# Patient Record
Sex: MALE | Race: BLACK OR AFRICAN AMERICAN
[De-identification: names, ages, dates, MRNs, and addresses within clinical notes are randomized per-mention and may not be internally consistent; named-entity substitution may affect disease eponyms.]

---

## 2020-02-13 ENCOUNTER — HOSPITAL ENCOUNTER (INPATIENT)
Dept: HOSPITAL 72 - EMR | Age: 85
LOS: 4 days | Discharge: NURSING, REHAB OR LONG-TERM CARE | DRG: 191 | End: 2020-02-17
Payer: MEDICARE

## 2020-02-13 VITALS — WEIGHT: 160 LBS | HEIGHT: 67 IN | BODY MASS INDEX: 25.11 KG/M2

## 2020-02-13 VITALS — SYSTOLIC BLOOD PRESSURE: 140 MMHG | DIASTOLIC BLOOD PRESSURE: 77 MMHG

## 2020-02-13 VITALS — DIASTOLIC BLOOD PRESSURE: 81 MMHG | SYSTOLIC BLOOD PRESSURE: 122 MMHG

## 2020-02-13 VITALS — DIASTOLIC BLOOD PRESSURE: 59 MMHG | SYSTOLIC BLOOD PRESSURE: 115 MMHG

## 2020-02-13 VITALS — DIASTOLIC BLOOD PRESSURE: 76 MMHG | SYSTOLIC BLOOD PRESSURE: 130 MMHG

## 2020-02-13 DIAGNOSIS — K21.9: ICD-10-CM

## 2020-02-13 DIAGNOSIS — J40: ICD-10-CM

## 2020-02-13 DIAGNOSIS — Z87.891: ICD-10-CM

## 2020-02-13 DIAGNOSIS — D63.8: ICD-10-CM

## 2020-02-13 DIAGNOSIS — I10: ICD-10-CM

## 2020-02-13 DIAGNOSIS — R04.2: ICD-10-CM

## 2020-02-13 DIAGNOSIS — J44.1: Primary | ICD-10-CM

## 2020-02-13 DIAGNOSIS — K44.9: ICD-10-CM

## 2020-02-13 LAB
ADD MANUAL DIFF: NO
ALBUMIN SERPL-MCNC: 2.8 G/DL (ref 3.4–5)
ALBUMIN/GLOB SERPL: 0.6 {RATIO} (ref 1–2.7)
ALP SERPL-CCNC: 83 U/L (ref 46–116)
ALT SERPL-CCNC: 31 U/L (ref 12–78)
ANION GAP SERPL CALC-SCNC: 8 MMOL/L (ref 5–15)
AST SERPL-CCNC: 28 U/L (ref 15–37)
BASOPHILS NFR BLD AUTO: 0.7 % (ref 0–2)
BILIRUB SERPL-MCNC: 0.3 MG/DL (ref 0.2–1)
BUN SERPL-MCNC: 26 MG/DL (ref 7–18)
CALCIUM SERPL-MCNC: 9.1 MG/DL (ref 8.5–10.1)
CHLORIDE SERPL-SCNC: 102 MMOL/L (ref 98–107)
CO2 SERPL-SCNC: 31 MMOL/L (ref 21–32)
CREAT SERPL-MCNC: 1.1 MG/DL (ref 0.55–1.3)
EOSINOPHIL NFR BLD AUTO: 1.2 % (ref 0–3)
ERYTHROCYTE [DISTWIDTH] IN BLOOD BY AUTOMATED COUNT: 13 % (ref 11.6–14.8)
GLOBULIN SER-MCNC: 4.9 G/DL
HCT VFR BLD CALC: 34.5 % (ref 42–52)
HGB BLD-MCNC: 11.2 G/DL (ref 14.2–18)
LYMPHOCYTES NFR BLD AUTO: 9.4 % (ref 20–45)
MCV RBC AUTO: 84 FL (ref 80–99)
MONOCYTES NFR BLD AUTO: 8.9 % (ref 1–10)
NEUTROPHILS NFR BLD AUTO: 79.8 % (ref 45–75)
PHOSPHATE SERPL-MCNC: 3.3 MG/DL (ref 2.5–4.9)
PLATELET # BLD: 320 K/UL (ref 150–450)
POTASSIUM SERPL-SCNC: 4.2 MMOL/L (ref 3.5–5.1)
RBC # BLD AUTO: 4.12 M/UL (ref 4.7–6.1)
SODIUM SERPL-SCNC: 140 MMOL/L (ref 136–145)
WBC # BLD AUTO: 10.7 K/UL (ref 4.8–10.8)

## 2020-02-13 PROCEDURE — 83880 ASSAY OF NATRIURETIC PEPTIDE: CPT

## 2020-02-13 PROCEDURE — 84100 ASSAY OF PHOSPHORUS: CPT

## 2020-02-13 PROCEDURE — 94640 AIRWAY INHALATION TREATMENT: CPT

## 2020-02-13 PROCEDURE — 87040 BLOOD CULTURE FOR BACTERIA: CPT

## 2020-02-13 PROCEDURE — 83735 ASSAY OF MAGNESIUM: CPT

## 2020-02-13 PROCEDURE — 96368 THER/DIAG CONCURRENT INF: CPT

## 2020-02-13 PROCEDURE — 71045 X-RAY EXAM CHEST 1 VIEW: CPT

## 2020-02-13 PROCEDURE — 80053 COMPREHEN METABOLIC PANEL: CPT

## 2020-02-13 PROCEDURE — 83605 ASSAY OF LACTIC ACID: CPT

## 2020-02-13 PROCEDURE — 99285 EMERGENCY DEPT VISIT HI MDM: CPT

## 2020-02-13 PROCEDURE — 85025 COMPLETE CBC W/AUTO DIFF WBC: CPT

## 2020-02-13 PROCEDURE — 71250 CT THORAX DX C-: CPT

## 2020-02-13 PROCEDURE — 93005 ELECTROCARDIOGRAM TRACING: CPT

## 2020-02-13 PROCEDURE — 36415 COLL VENOUS BLD VENIPUNCTURE: CPT

## 2020-02-13 PROCEDURE — 84484 ASSAY OF TROPONIN QUANT: CPT

## 2020-02-13 PROCEDURE — 83690 ASSAY OF LIPASE: CPT

## 2020-02-13 PROCEDURE — 87081 CULTURE SCREEN ONLY: CPT

## 2020-02-13 PROCEDURE — 96365 THER/PROPH/DIAG IV INF INIT: CPT

## 2020-02-13 RX ADMIN — Medication SCH MCG: at 15:00

## 2020-02-13 RX ADMIN — ALBUTEROL SULFATE SCH MG: 2.5 SOLUTION RESPIRATORY (INHALATION) at 15:37

## 2020-02-13 RX ADMIN — ALBUTEROL SULFATE SCH MG: 2.5 SOLUTION RESPIRATORY (INHALATION) at 15:21

## 2020-02-13 RX ADMIN — Medication SCH MCG: at 15:21

## 2020-02-13 RX ADMIN — Medication SCH MCG: at 15:37

## 2020-02-13 RX ADMIN — ALBUTEROL SULFATE SCH MG: 2.5 SOLUTION RESPIRATORY (INHALATION) at 15:00

## 2020-02-13 NOTE — NUR
NURSE NOTES:

Dr. Reyez called with the following orders:



- Resume snf meds

- resume snf diet

- resume snf code status

- duoneb q4 prn

- dvt: scd

- consult Dr. Minh Carvalho 



Will input orders and will continue to monitor.

## 2020-02-13 NOTE — DIAGNOSTIC IMAGING REPORT
. Indication: Cough

 

Technique: One view of the chest

 

Comparison: none

 

Findings: There is mild central bronchial wall thickening. Lungs and pleural spaces

are otherwise clear. The heart size is upper limits of normal. The aorta is tortuous

ectatic and calcified

 

Impression: Central bronchial wall thickening, nonspecific. No acute process

otherwise

## 2020-02-13 NOTE — NUR
NURSE NOTES:

Called and left a message with Dr. Reyez regarding admission orders. Awaiting call back.

## 2020-02-13 NOTE — NUR
ED Nurse Note:

PT was transported to TELE room 205 accompanied by 2 RNs via gurney with 
monitor box in stable condition.  Belonging list given to JUAN DAVID Seaman.

## 2020-02-13 NOTE — NUR
ED Nurse Note:



PT Brought in by ambulance from [facility] for  
abnormal CXR modest right upper lobe PNA.

## 2020-02-13 NOTE — HISTORY AND PHYSICAL REPORT
DATE OF ADMISSION:  02/13/2020

HISTORY OF PRESENT ILLNESS:  The patient is admitted for pneumonia and COPD

exacerbation.  The patient complains of shortness of breath and cough for

couple of days.  The patient also had a chest x-ray that shows pneumonia

at the nursing home.  He is admitted for that as well.  Denies nausea,

vomiting, or diarrhea.  No fever or chills.  Does have shortness of

breath.



PAST MEDICAL HISTORY:  History of COPD, history of hypertension, and

GERD.



PAST SURGICAL HISTORY:  None.



ALLERGIES:  No known allergies.



MEDICATIONS:  ______, amlodipine, omeprazole, breathing treatment.



FAMILY HISTORY:  Noncontributory.



SOCIAL HISTORY:  He has history of smoking.  Denies alcohol or illicit

drugs.  Comes from a nursing home.



REVIEW OF SYSTEMS:  HEENT:  Denies headaches.  RESPIRATORY:  Reports

shortness of breath and cough and wheezing for couple of days.

CARDIOVASCULAR:  Denies chest pain.  GASTROINTESTINAL:  Denies nausea,

vomiting, or diarrhea.  EXTREMITIES:  Denies pain.  CENTRAL NERVOUS

SYSTEM:  Denies changes in speech pattern.



PHYSICAL EXAMINATION:

VITAL SIGNS:  Temperature 97.5, pulse is 95, blood pressure 100/97.

HEENT:  PERRLA.

NECK:  Supple.  No lymphadenopathy.

CHEST:  Clear to auscultation.

CARDIOVASCULAR:  Regular rate and rhythm.  No murmurs or extra sounds.

GASTROINTESTINAL:  Soft, nontender, nondistended.  No organomegaly.

EXTREMITIES:  No edema.  Moves all four extremities.  Sensory intact to

light touch.  Reflexes on both sides.



LABORATORY DATA:  WBC of 10.7.  Sodium 140, potassium 4.2, BUN of 22,

creatinine 1.1, glucose 148.  Chest x-ray shows pneumonia.



ASSESSMENT AND PLAN:  COPD exacerbation as well as pneumonia.  I have asked

Dr. Rodolfo Limon as well as Dr. Minh Carvalho to see the patient for COPD

as well as pneumonia.  Antibiotics per Dr. Minh Carvalho.









  ______________________________________________

  Car Reyez M.D.





DR:  BOB

D:  02/13/2020 21:23

T:  02/13/2020 23:12

JOB#:  [id number]

CC:

## 2020-02-13 NOTE — NUR
NURSE NOTES:

Received pt from JUAN DAVID Seaman. Pt awake, alert, and talkative. Bed in lowest position. Call light 
within reach. Will continue to monitor.

## 2020-02-13 NOTE — EMERGENCY ROOM REPORT
History of Present Illness


General


Chief Complaint:  Cough, shortness of breath


Source:  Patient, EMS





Present Illness


HPI


84-year-old male history of hypertension history of COPD presents with cough, 

shortness of breath recently diagnosed with pneumonia day ago, no aggravating 

relieving factors severity is moderate, constant he denies any fevers, he 

denies any chest pain patient was sent in for evaluation and treatment


Allergies:  


Coded Allergies:  


     No Known Allergies (Unverified , 2/13/20)





Patient History


Past Medical History:  see triage record


Social History:  Reports: smoking


Reviewed Nursing Documentation:  PMH: Agreed; PSxH: Agreed





Review of Systems


All Other Systems:  negative except mentioned in HPI





Physical Exam





Vital Signs








  Date Time  Temp Pulse Resp B/P (MAP) Pulse Ox O2 Delivery O2 Flow Rate FiO2


 


2/13/20 14:23 97.0 82 18 132/70 (90) 96 Nasal Cannula 3.0 








Sp02 EP Interpretation:  reviewed, normal


General Appearance:  no apparent distress, alert


Head:  normocephalic, atraumatic


Eyes:  bilateral eye PERRL, bilateral eye EOMI


ENT:  uvula midline, moist mucus membranes


Neck:  supple, thyroid normal, supple/symm/no masses


Respiratory:  decreased breath sounds, accessory muscle use, rales, wheezing, 

other - Moderate wheezing


Cardiovascular #1:  normal peripheral pulses, regular rate, rhythm, no edema, 

no gallop, no murmur


Gastrointestinal:  non tender, soft, no guarding, no rebound


Musculoskeletal:  normal inspection


Neurologic:  alert, oriented x3


Psychiatric:  mood/affect normal


Skin:  no rash, warm/dry





Medical Decision Making


Diagnostic Impression:  


 Primary Impression:  


 Pneumonia


 Qualified Codes:  J18.9 - Pneumonia, unspecified organism


 Additional Impression:  


 COPD with exacerbation


ER Course


84-year-old male, presents with shortness of breath, cough with sputum 

production, patient recently diagnosed with pneumonia a day or so ago, will 

admit patient for antibiotics, steroids, duo nebs, will treat patient for H CAP


Reevaluation 3:48 PM patient remained stable doing better with breathing 

treatment steroids, antibiotics


Plan to admit to Dr. Reyez





Laboratory Tests








Test


  2/13/20


14:36


 


White Blood Count


  10.7 K/UL


(4.8-10.8)


 


Red Blood Count


  4.12 M/UL


(4.70-6.10)  L


 


Hemoglobin


  11.2 G/DL


(14.2-18.0)  L


 


Hematocrit


  34.5 %


(42.0-52.0)  L


 


Mean Corpuscular Volume 84 FL (80-99)  


 


Mean Corpuscular Hemoglobin


  27.3 PG


(27.0-31.0)


 


Mean Corpuscular Hemoglobin


Concent 32.6 G/DL


(32.0-36.0)


 


Red Cell Distribution Width


  13.0 %


(11.6-14.8)


 


Platelet Count


  320 K/UL


(150-450)


 


Mean Platelet Volume


  7.3 FL


(6.5-10.1)


 


Neutrophils (%) (Auto)


  79.8 %


(45.0-75.0)  H


 


Lymphocytes (%) (Auto)


  9.4 %


(20.0-45.0)  L


 


Monocytes (%) (Auto)


  8.9 %


(1.0-10.0)


 


Eosinophils (%) (Auto)


  1.2 %


(0.0-3.0)


 


Basophils (%) (Auto)


  0.7 %


(0.0-2.0)


 


Sodium Level


  140 MMOL/L


(136-145)


 


Potassium Level


  4.2 MMOL/L


(3.5-5.1)


 


Chloride Level


  102 MMOL/L


()


 


Carbon Dioxide Level


  31 MMOL/L


(21-32)


 


Anion Gap


  8 mmol/L


(5-15)


 


Blood Urea Nitrogen


  26 mg/dL


(7-18)  H


 


Creatinine


  1.1 MG/DL


(0.55-1.30)


 


Estimate Glomerular


Filtration Rate > 60 mL/min


(>60)


 


Glucose Level


  148 MG/DL


()  H


 


Lactic Acid Level


  1.50 mmol/L


(0.4-2.0)


 


Calcium Level


  9.1 MG/DL


(8.5-10.1)


 


Phosphorus Level


  3.3 MG/DL


(2.5-4.9)


 


Magnesium Level


  1.9 MG/DL


(1.8-2.4)


 


Total Bilirubin


  0.3 MG/DL


(0.2-1.0)


 


Aspartate Amino Transferase


(AST) 28 U/L (15-37)


 


 


Alanine Aminotransferase (ALT)


  31 U/L (12-78)


 


 


Alkaline Phosphatase


  83 U/L


()


 


Troponin I


  0.000 ng/mL


(0.000-0.056)


 


Pro-B-Type Natriuretic Peptide


  387 pg/mL


(0-125)  H


 


Total Protein


  7.7 G/DL


(6.4-8.2)


 


Albumin


  2.8 G/DL


(3.4-5.0)  L


 


Globulin 4.9 g/dL  


 


Albumin/Globulin Ratio


  0.6 (1.0-2.7)


L


 


Lipase


  232 U/L


()








EKG Diagnostic Results


EKG Time:  14:38


EP Interpretation:  NSR rate 92, normal axis, no acute st elevations, qtc 435





Rhythm Strip Diag. Results


Rhythm Strip Time:  15:01


EP Interpretation:  yes


Rate:  74


Rhythm:  NSR, no PVC's, no ectopy





Chest X-Ray Diagnostic Results


Chest X-Ray Diagnostic Results :  


   Chest X-Ray Ordered:  Yes


   # of Views/Limited/Complete:  1 View


   Indication:  Shortness of Breath


   EP Interpretation:  Yes


   Interpretation:  other - Possible right lower lobe infiltrate


   Impression:  Other - Pneumonia


   Electronically Signed by:  Florencio Santoyo MD


Disposition:  ADMITTED AS INPATIENT


Condition:  Serious











Florencio Santoyo MD Feb 13, 2020 14:19

## 2020-02-13 NOTE — NUR
NURSE NOTES:

Received report from JUAN DAVID Velazquez. Patient in bed resting, no active s/s cardiac, respiratory 
distress noticed at this time. Patient AOx4, on 4L oxygen via NC, tolerating well 95%. VS at 
the time of arrival, /78, , 97.9, O2 sat. 95%. Patient able to ambulate to 
[facility] to bed. IV on right Ac 20G, asymptomatic, patent, intact. Bed in lowest position, 
side rails upx3, call light within reach. Will continue to monitor.

## 2020-02-14 VITALS — SYSTOLIC BLOOD PRESSURE: 141 MMHG | DIASTOLIC BLOOD PRESSURE: 76 MMHG

## 2020-02-14 VITALS — DIASTOLIC BLOOD PRESSURE: 62 MMHG | SYSTOLIC BLOOD PRESSURE: 118 MMHG

## 2020-02-14 VITALS — DIASTOLIC BLOOD PRESSURE: 70 MMHG | SYSTOLIC BLOOD PRESSURE: 149 MMHG

## 2020-02-14 VITALS — SYSTOLIC BLOOD PRESSURE: 142 MMHG | DIASTOLIC BLOOD PRESSURE: 69 MMHG

## 2020-02-14 VITALS — SYSTOLIC BLOOD PRESSURE: 145 MMHG | DIASTOLIC BLOOD PRESSURE: 73 MMHG

## 2020-02-14 VITALS — DIASTOLIC BLOOD PRESSURE: 77 MMHG | SYSTOLIC BLOOD PRESSURE: 134 MMHG

## 2020-02-14 RX ADMIN — GUAIFENESIN AND DEXTROMETHORPHAN PRN ML: 100; 10 SYRUP ORAL at 21:06

## 2020-02-14 RX ADMIN — GUAIFENESIN AND DEXTROMETHORPHAN PRN ML: 100; 10 SYRUP ORAL at 13:41

## 2020-02-14 RX ADMIN — SODIUM CHLORIDE SCH MLS/HR: 0.9 INJECTION INTRAVENOUS at 11:08

## 2020-02-14 RX ADMIN — GUAIFENESIN AND DEXTROMETHORPHAN PRN ML: 100; 10 SYRUP ORAL at 09:40

## 2020-02-14 NOTE — NUR
P.T Note:

P.T evaluation completed. Pt is alert, O x 4 , pleasant and cooperative. No c/o pain. Pt is  
functioning independently  and at currently at baseline. Pt's present functional status does 
not warrant skilled P.T services a this time. Pt educated on importance of OOB activities VS 
bedrest during stay unless otherwise ordered. Pt verbalized understanding. No further P.T 
follow needed. DC P.T services. Thank you for this referral.

## 2020-02-14 NOTE — NUR
NURSE NOTES: PATIENT AWAKE, ALERT/ORIENTED X3, VERBALLY RESPONSIVE, DENIES PAIN. HEARING 
DEFICIT BILATERALLY. NO SIGNS AND SYMPTOMS OF ACUTE CARDIO RESPIRATORY DISTRESS/SHORTNESS OF 
BREATH, DENIES CHEST PAIN, NO PERIPHERAL EDEMA. IV INTACT TO RIGHT AC/GAUGE 20 SL, NO 
REDNESS/SWELLING NOTED. SINUS RHYTHM ON CARDIAC MONITOR. NO COMPLAINTS OF GI DISCOMFORT, NO 
N/V/D. SIDE RAILS UP X3/BED IN LOWEST POSITION FOR SAFETY, ENCOURAGED PATIENT TO UTILIZE 
CALL LIGHT FOR ASSISTANCE. CONTINUE WITH CURRENT PLAN OF CARE. NAD.

## 2020-02-14 NOTE — NUR
CASE MANAGEMENT:REVIEW



84 YR OLD MALE BIBA FROM [facility]



CC : ABNORMAL CHEST XRAY



SI: PNA. COPD

97.0   82  18    132/70   96% ON 3L/NC



IS: IV VANCOMYCIN

IV CEFEPIME

DUONEB HHN Q15

PREDNISONE PO

CHEST XRAY

BLOOD CX

**: TO TELEMETRY 

DCP: RETURN TO SNF



**INTERQUAL CRITERIA MET

## 2020-02-14 NOTE — CONSULTATION
History of Present Illness


General


Chief Complaint:  Upper Respiratory Illness





Present Illness


Allergies:  


Coded Allergies:  


     No Known Allergies (Unverified , 2/13/20)





Medication History


Scheduled


Amlodipine Besylate* (Amlodipine Besylate*), 5 MG ORAL DAILY, (Reported)


Clonidine Hcl* (Catapres*), 0.1 MG ORAL EVERY 6 HOURS, (Reported)


Ipratropium/Albuterol Sulfate (DuoNeb 0.5-3(2.5)mg/3ml), 3 ML HHN EVERY 4 HOURS,

 (Reported)


Omeprazole Magnesium (Prilosec Otc), 20 MG ORAL DAILY, (Reported)





Scheduled PRN


Acetaminophen* (Acetaminophen 325MG Tablet*), 650 MG ORAL Q4H PRN for For Pain, 

(Reported)


Ondansetron* (Zofran*), 4 MG ORAL Q6H PRN for Nausea & Vomiting, (Reported)





Patient History


Healthcare decision maker


in chart


Resuscitation status


Full Code


Advanced Directive on File








Physical Exam





Last 24 Hour Vital Signs








  Date Time  Temp Pulse Resp B/P (MAP) Pulse Ox O2 Delivery O2 Flow Rate FiO2


 


2/14/20 12:01 97.7 89 18 145/73 (97) 97   


 


2/14/20 12:00  94      


 


2/14/20 09:40  81  134/77    


 


2/14/20 09:00      Nasal Cannula 3.0 


 


2/14/20 08:00 96.8 81 18 134/77 (96) 96   


 


2/14/20 07:49  92      


 


2/14/20 07:02     96 Nasal Cannula 2.0 28


 


2/14/20 04:00  82      


 


2/14/20 04:00 97.7 92 18 141/76 (97) 96   


 


2/14/20 00:00 97.4 95 21 118/62 (80) 99   


 


2/14/20 00:00  95      


 


2/13/20 22:13  99 20  98 Nasal Cannula 3.0 32





  96 20  94   


 


2/13/20 21:55      Nasal Cannula 2.0 


 


2/13/20 20:00 97.8 116 18 115/59 (77) 99   


 


2/13/20 20:00  99      


 


2/13/20 18:31 97.8 95 17 135/76 96 Nasal Cannula 3.0 


 


2/13/20 18:19 97.5 90 17 130/76 96 Nasal Cannula 3.0 


 


2/13/20 16:22 97.5 94 17 122/81 97 Nasal Cannula 3.0 

















Intake and Output  


 


 2/13/20 2/14/20





 19:00 07:00


 


Intake Total 385.0 ml 


 


Balance 385.0 ml 


 


  


 


IV Total 385.0 ml 


 


# Voids  3











Microbiology








 Date/Time


Source Procedure


Growth Status


 


 


 2/13/20 19:45


Rectum  Received








Height (Feet):  5


Height (Inches):  7.00


Weight (Pounds):  160


Medications





Current Medications








 Medications


  (Trade)  Dose


 Ordered  Sig/Lynn


 Route


 PRN Reason  Start Time


 Stop Time Status Last Admin


Dose Admin


 


 Acetaminophen


  (Tylenol)  650 mg  Q4H  PRN


 ORAL


 Mild Pain/Temp > 100.5  2/13/20 21:15


 3/14/20 21:14   


 


 


 Albuterol/


 Ipratropium


  (Albuterol/


 Ipratropium)  3 ml  Q4H  PRN


 HHN


 Shortness of Breath  2/13/20 21:15


 2/18/20 21:14  2/13/20 22:12


 


 


 Amlodipine


 Besylate


  (Norvasc)  5 mg  DAILY


 ORAL


   2/14/20 09:00


 3/15/20 08:59  2/14/20 09:40


 


 


 Azithromycin


  (Zithromax)  250 mg  DAILY


 ORAL


   2/15/20 09:00


 2/22/20 08:59   


 


 


 Ceftriaxone


 Sodium 1 gm/


 Dextrose  55 ml @ 


 110 mls/hr  Q24H


 IVPB


   2/14/20 11:00


 2/21/20 10:59  2/14/20 11:08


 


 


 Clonidine HCl


  (Catapres Tab)  0.1 mg  Q6H  PRN


 ORAL


 SBP>170  2/13/20 21:15


 3/14/20 21:14   


 


 


 Guaifenesin/


 Dextromethorphan


  (Robitussin DM


 Syrup)  10 ml  Q4H  PRN


 ORAL


 For Cough  2/14/20 07:00


 3/15/20 06:59  2/14/20 13:41


 


 


 Ondansetron HCl


  (Zofran)  4 mg  Q6H  PRN


 IVP


 Nausea & Vomiting  2/13/20 21:15


 3/14/20 21:14   


 


 


 Pantoprazole


  (Protonix)  40 mg  DAILY


 ORAL


   2/14/20 09:00


 3/15/20 08:59  2/14/20 09:41


 


 


 Prednisone


  (predniSONE)  20 mg  DAILY


 ORAL


   2/14/20 13:30


 3/15/20 13:29  2/14/20 13:41


 











Assessment/Plan


Assessment/Plan:


Hematology Consultaiton





Chief Complaint:  Cough, shortness of breath


DOS: 2/14/2020


RFC: Hemoptysis





HPI


84-year-old male history of hypertension history of COPD presents with cough, 

shortness of breath recently diagnosed with pneumonia day ago, no aggravating 

relieving factors severity is moderate, constant he denies any fevers, he 

denies any chest pain patient was sent in for evaluation and treatment, noted 

at this time to have hemoptysis, cxr noted, pulm consulted, awaiting further 

recs, heme consulted


 


Coded Allergies:  


     No Known Allergies (Unverified , 2/13/20)





Patient History


Past Medical History:  see triage record


Social History:  Reports: smoking


Reviewed Nursing Documentation:  PMH: Agreed; PSxH: Agreed





ROS (review of systems):


Constitutional: No fever, no chills, no night sweats, no fatigue


Skin: No rashes, lumps, itchiness, dryness


HEENT: No HA, ear ache, visual changes, double vision, nosebleeds


Breasts: No lumps, pain, discharge


Pulmonary: No cough, sputum, shortness of breath, coughing up blood


Cardiovascular: No chest pain, tightness, palpitations, syncope, PND


GI: No nausea, vomiting, diarrhea, melena, hematochezia, change in appetite, 


: No dysuria, frequency, urgency, urinary incontinence, foamy urine


Musculoskeletal: No joint swelling or muscle pain, trauma, back pain


Neurologic: No dizziness, fainting, seizures, changes in smell or taste


Psychiatric: No nervousness, stress, or depression, anxiety, hallucinations


Endocrine: No weight change, heat or cold intolerance, tremor, insomnia





Physical Exam:


Vitals: reviewed


General:  NAD


HEENT:  nc, at


Neck: supple


Chest: clear breath sounds bilaterally


Cardiovascular:  RRR, no s3, s4


Abdomen:  soft, nontender, nd


Extremities: no cce, normal range of motion


Neuro: confused





Labs: noted





Imaging: cxr shows central infiltration





Assessment and Recs:


# Anemia of chronic disease due to underlying chronic medical issues, 

multifactorial v Gi bleed 


--> Anemia workup has been ordered, rule out gi bleed 


--> No evidence of hemolysis is noted, peripheral smear has been reviewed.


--> Hgb goal >7. Transfuse prn.


--> Epogen or iron at this time is not particularly indicated


--> Medications have been reviewed


--> low threshold for gi evaluation in case has occult +


# Hemoptysis -- r/o malignancy


--> likely bronchitis


--> any furthre imaging as per Dr. Ashly herndon


# Pneumonia central


--> ctx and azithro


--> breathing treatment as needed


# COPD with exacerbation


--> off breathing rx


# Gerd -- ppi


# Dvt ppx scds





Appreciate consultation and dw Amauri Valdes MD Feb 14, 2020 15:55

## 2020-02-14 NOTE — NUR
NURSE NOTES:

Dr. Reyez called back with the following orders:



- Robutussin DM 5cc q4hr prn

- inform Dr. Limon of bloody sputum

- add tirbradford to consults



Will do the following and will continue to monitor.

## 2020-02-14 NOTE — NUR
NURSE NOTES:

Called and left a message with Dr. Reyez regarding pts episode of bloody mucous and request 
for cough syrup. Awaiting call back.

## 2020-02-14 NOTE — NUR
NURSE NOTES:

Received pt from [facility], Pt is awake and alert, Pt has NC 3LMP. pt has intact iv access RAC 
20G SL. Pt is on continues heart monitoring. no complain of pain at this moment. pt is 
eating breakfast by observation. all needs attended, bed is locked and is in the lowest 
position, call light within easy reach. will continue to monitor.

## 2020-02-14 NOTE — CONSULTATION
DATE OF CONSULTATION:  02/14/2020

PULMONARY CONSULTATION



CONSULTING PHYSICIAN:  Rodolfo Limon M.D.



REFERRING PHYSICIAN:  Car Reyez M.D.



HISTORY OF PRESENT ILLNESS:  This is an 84-year-old male who is a nursing

home resident.  He was brought to the hospital with shortness of breath,

cough, _____ history of COPD.  The patient has been coughing blood-tinged

sputum.



PAST MEDICAL HISTORY:  Notable for COPD.



SOCIAL HISTORY:  He has been a smoker in the past.  Current nursing home

resident.



HOME MEDICATIONS:  Reviewed, reconciled in chart.



REVIEW OF SYSTEMS:  Denies any headaches, hematemesis, melena,

hematochezia, night sweats, or weight loss.



PHYSICAL EXAMINATION:

GENERAL:  Reveals an 84-year-old male.

VITAL SIGNS:  Blood pressure 130/70, heart rate 84, respirations 18,

afebrile.

HEENT:  Unremarkable.

CHEST:  A few bilateral rhonchi.

ABDOMEN:  Soft.

EXTREMITIES:  There is no edema.



LABORATORY DATA:  Laboratory testing shows white count 10.7, hemoglobin

11.2.  Chemistries are unremarkable.  EKG, normal sinus rhythm.  X-ray of

chest shows right lower lobe infiltrate, pulmonary bleeding per Radiology,

does not show any acute pneumonic process.



IMPRESSION:  Acute exacerbation of COPD.



DISCUSSION:  Agree with current admission and care.  I have reviewed his

home medications.  I note the patient has been started on azithromycin and

IV Rocephin.  He also received prednisone orally yesterday.  I will

continue oral prednisone.  Continue breathing treatments.  Continue

antibiotics.  Anticipate discharge, back to skilled nursing facility in

the next 24 to 48 hours.









  ______________________________________________

  Rodolfo Limon M.D. DR:  YENY

D:  02/14/2020 13:30

T:  02/14/2020 17:26

JOB#:  [id number]

CC:

## 2020-02-14 NOTE — CONSULTATION
DATE OF CONSULTATION:  02/14/2020

INFECTIOUS DISEASE CONSULTATION



CONSULTING PHYSICIAN:  Minh Carvalho M.D.



PRIMARY ATTENDING PHYSICIAN:  Car Reyez M.D.



REASON FOR CONSULT:  Hemoptysis, COPD exacerbation.



HISTORY OF PRESENT ILLNESS:  This is an 84-year-old white male with history

of COPD admitted yesterday complaining of shortness of breath and

coughing.  The patient is a nursing home resident.  Denies any fever and

chills.  He has blood in his sputum.



PAST MEDICAL HISTORY:  COPD and hypertension.



ALLERGIES:  No known drug allergies.



MEDICATIONS:  Amlodipine, Protonix, Robitussin syrup, albuterol,

ipratropium inhaler, Tylenol, clonidine, Zofran.  Got a dose of cefepime

and vancomycin.



SOCIAL HISTORY:  Single.   three times,  three times.

Originally, he is from [place].  Currently nursing home resident.  He has

history of smoking, quit three years ago.  Denies alcohol or drug abuse.



REVIEW OF SYSTEMS:  No fever.  No chills.  He has clogged nose, productive

cough with speckles of blood in it.  No nausea.  No vomiting.  No

dysuria.



PHYSICAL EXAMINATION:

VITAL SIGNS:  Temperature 96.8, pulse 81, and blood pressure

134/77.

GENERAL APPEARANCE:  No acute distress.

HEAD AND NECK:  Pink conjunctivae.  No oral lesion.

HEART:  Normal rate.

LUNGS:  Decreased expansion and sounds bilaterally.  Getting oxygen by

nasal cannula.

ABDOMEN:  Soft and nontender.

EXTREMITIES:  No edema.



LABORATORY AND DIAGNOSTIC DATA:  WBC 10.7, hemoglobin 11.2, hematocrit

30.5, and platelets 320,000.  Sodium 140, potassium 4.2, chloride 102,

bicarb 31, BUN 26, and creatinine 1.1.  Glucose is 148.  Albumin is 2.8.



Chest x-ray shows central bronchial wall thickening nonspecific.  No

acute process otherwise.



IMPRESSION:  COPD exacerbation, hemoptysis likely secondary to bronchitis,

hypertension, history of nicotine dependence.



RECOMMENDATION:  The patient was started on Zithromax and Rocephin.  We

will follow up the cultures, clinical course.  We will consider doing CT

scan of the chest if the patient does not become better.



At the end of my exam, I thank Dr. Reyez for involving me in the care

of this patient.









  ______________________________________________

  Minh Carvalho M.D.





DR:  TANIYA

D:  02/14/2020 09:51

T:  02/14/2020 16:59

JOB#:  [id number]

CC:

## 2020-02-15 VITALS — SYSTOLIC BLOOD PRESSURE: 158 MMHG | DIASTOLIC BLOOD PRESSURE: 81 MMHG

## 2020-02-15 VITALS — SYSTOLIC BLOOD PRESSURE: 150 MMHG | DIASTOLIC BLOOD PRESSURE: 65 MMHG

## 2020-02-15 VITALS — DIASTOLIC BLOOD PRESSURE: 69 MMHG | SYSTOLIC BLOOD PRESSURE: 145 MMHG

## 2020-02-15 VITALS — DIASTOLIC BLOOD PRESSURE: 70 MMHG | SYSTOLIC BLOOD PRESSURE: 147 MMHG

## 2020-02-15 VITALS — SYSTOLIC BLOOD PRESSURE: 147 MMHG | DIASTOLIC BLOOD PRESSURE: 75 MMHG

## 2020-02-15 VITALS — DIASTOLIC BLOOD PRESSURE: 67 MMHG | SYSTOLIC BLOOD PRESSURE: 125 MMHG

## 2020-02-15 RX ADMIN — SODIUM CHLORIDE SCH MLS/HR: 0.9 INJECTION INTRAVENOUS at 11:09

## 2020-02-15 RX ADMIN — AZITHROMYCIN DIHYDRATE SCH MG: 250 TABLET, FILM COATED ORAL at 08:42

## 2020-02-15 NOTE — NUR
NURSE NOTES: RECEIVED PATIENT LYING IN BED, AWAKE, ALERT/ORIENTED X3, DENIES PAIN. IV INTACT 
TO RAC GAUGE 20 SL, NO REDNESS/SWELLING NOTED. SINUS RHYTHM ON CARDIAC MONITOR. NO SIGNS AND 
SYMPTOMS OF ACUTE CARDIO RESPIRATORY DISTRESS/SHORTNESS OF BREATH, DENIES CHEST PAIN, LUNGS 
CLEAR, NO PERIPHERAL EDEMA NOTED. ABDOMEN SOFT/NON DISTENDED, AUDIBLE BOWEL SOUNDS, NO 
N/V/D. SIDE RAILS UP X2 FOR MOBILITY, BED IN LOWEST POSITION FOR SAFETY, ENCOURAGED PATIENT 
TO UTILIZE CALL LIGHT FOR ASSISTANCE, VERBALIZED UNDERSTANDING. CONTINUE WITH CURRENT PLAN 
OF CARE. NAD.

## 2020-02-15 NOTE — NUR
NURSE NOTES: RESTED WELL THROUGHOUT THE NIGHT, NO SIGNIFICANT CHANGE OF CONDITION NOTED. 
SAFETY MAINTAINED. NAD.

## 2020-02-15 NOTE — GENERAL PROGRESS NOTE
Assessment/Plan


Problem List:  


(1) Pneumonia


ICD Codes:  J18.9 - Pneumonia, unspecified organism


SNOMED:  857419790


Qualifiers:  


   Qualified Codes:  J18.9 - Pneumonia, unspecified organism


(2) COPD with exacerbation


ICD Codes:  J44.1 - Chronic obstructive pulmonary disease with (acute) 

exacerbation


SNOMED:  844290544


Status:  progressing


Assessment/Plan:


hemoptysis improving


cough improving


copd


pna





Subjective


ROS Limited/Unobtainable:  Yes


Allergies:  


Coded Allergies:  


     No Known Allergies (Unverified , 2/13/20)





Objective





Last 24 Hour Vital Signs








  Date Time  Temp Pulse Resp B/P (MAP) Pulse Ox O2 Delivery O2 Flow Rate FiO2


 


2/15/20 12:00 98.2 87 20 150/65 (93) 95   


 


2/15/20 12:00  80      


 


2/15/20 09:00      Nasal Cannula 3.0 


 


2/15/20 08:43  92  147/70    


 


2/15/20 08:00  93      


 


2/15/20 08:00 97.9 92 20 147/70 (95) 95   


 


2/15/20 07:45     99 Nasal Cannula 2.0 28


 


2/15/20 04:00  62      


 


2/15/20 04:00 98.7 79 18 158/81 (106) 100   


 


2/15/20 00:00  70      


 


2/15/20 00:00 96.6 82 18 125/67 (86) 98   


 


2/14/20 23:31     97 Nasal Cannula 2.0 28


 


2/14/20 21:00      Nasal Cannula 3.0 


 


2/14/20 20:00  84      


 


2/14/20 20:00 97.3 89 20 142/69 (93) 100   


 


2/14/20 16:00 98.1 87 19 149/70 (96) 96   


 


2/14/20 15:45  76      

















Intake and Output  


 


 2/14/20 2/15/20





 19:00 07:00


 


Intake Total 335 ml 420 ml


 


Output Total 1500 ml 


 


Balance -1165 ml 420 ml


 


  


 


Intake Oral 280 ml 420 ml


 


IV Total 55 ml 


 


Output Urine Total 1500 ml 


 


# Voids 3 3








Height (Feet):  5


Height (Inches):  7.00


Weight (Pounds):  160


Neck:  supple


Cardiovascular:  normal rate


Respiratory/Chest:  lungs clear


Abdomen:  soft











Car Reyez MD Feb 15, 2020 15:28

## 2020-02-15 NOTE — NUR
NURSE NOTES:



Patient is sitting at bedside. Patient denies pain at this time. VSS. Bed alarm is on. 
Encouraged patient to use call light when in need of assistance, pt verbalized 
understanding. Will continue to monitor.

## 2020-02-15 NOTE — PULMONOLOGY PROGRESS NOTE
Assessment/Plan


Assessment/Plan


IMPRESSION:  Acute exacerbation of COPD.





DISCUSSION:  





Agree with current admission and care.  


Continue home medications.  


Continue azithromycin and IV Rocephin.  


Continue prednisone orally.


Continue breathing treatments.  





Anticipate discharge, back to skilled nursing facility in


the next 24 to 48 hours.














  ______________________________________________


  Rodolfo Limon M.D.





Subjective


Interval Events:  Feeling better


Constitutional:  Reports: no symptoms


HEENT:  Repors: no symptoms


Respiratory:  Reports: no symptoms


Cardiovascular:  Reports: no symptoms


Gastrointestinal/Abdominal:  Reports: no symptoms


Allergies:  


Coded Allergies:  


     No Known Allergies (Unverified , 2/13/20)





Objective





Last 24 Hour Vital Signs








  Date Time  Temp Pulse Resp B/P (MAP) Pulse Ox O2 Delivery O2 Flow Rate FiO2


 


2/15/20 08:43  92  147/70    


 


2/15/20 07:45     99 Nasal Cannula 2.0 28


 


2/15/20 04:00  62      


 


2/15/20 04:00 98.7 79 18 158/81 (106) 100   


 


2/15/20 00:00  70      


 


2/15/20 00:00 96.6 82 18 125/67 (86) 98   


 


2/14/20 23:31     97 Nasal Cannula 2.0 28


 


2/14/20 21:00      Nasal Cannula 3.0 


 


2/14/20 20:00  84      


 


2/14/20 20:00 97.3 89 20 142/69 (93) 100   


 


2/14/20 16:00 98.1 87 19 149/70 (96) 96   


 


2/14/20 15:45  76      


 


2/14/20 12:01 97.7 89 18 145/73 (97) 97   


 


2/14/20 12:00  94      

















Intake and Output  


 


 2/14/20 2/15/20





 19:00 07:00


 


Intake Total 335 ml 420 ml


 


Output Total 1500 ml 


 


Balance -1165 ml 420 ml


 


  


 


Intake Oral 280 ml 420 ml


 


IV Total 55 ml 


 


Output Urine Total 1500 ml 


 


# Voids 3 3








General Appearance:  no acute distress


HEENT:  normocephalic


Respiratory/Chest:  lungs clear


Cardiovascular:  normal peripheral pulses





Microbiology








 Date/Time


Source Procedure


Growth Status


 


 


 2/13/20 14:45


Blood Blood Culture - Preliminary


NO GROWTH AFTER 24 HOURS Resulted


 


 2/13/20 14:30


Blood Blood Culture - Preliminary


NO GROWTH AFTER 24 HOURS Resulted


 


 2/13/20 19:45


Rectum  Received











Current Medications








 Medications


  (Trade)  Dose


 Ordered  Sig/Lynn


 Route


 PRN Reason  Start Time


 Stop Time Status Last Admin


Dose Admin


 


 Acetaminophen


  (Tylenol)  650 mg  Q4H  PRN


 ORAL


 Mild Pain/Temp > 100.5  2/13/20 21:15


 3/14/20 21:14   


 


 


 Albuterol/


 Ipratropium


  (Albuterol/


 Ipratropium)  3 ml  Q4H  PRN


 HHN


 Shortness of Breath  2/13/20 21:15


 2/18/20 21:14  2/13/20 22:12


 


 


 Amlodipine


 Besylate


  (Norvasc)  5 mg  DAILY


 ORAL


   2/14/20 09:00


 3/15/20 08:59  2/15/20 08:43


 


 


 Azithromycin


  (Zithromax)  250 mg  DAILY


 ORAL


   2/15/20 09:00


 2/22/20 08:59  2/15/20 08:42


 


 


 Ceftriaxone


 Sodium 1 gm/


 Dextrose  55 ml @ 


 110 mls/hr  Q24H


 IVPB


   2/14/20 11:00


 2/21/20 10:59  2/15/20 11:09


 


 


 Clonidine HCl


  (Catapres Tab)  0.1 mg  Q6H  PRN


 ORAL


 SBP>170  2/13/20 21:15


 3/14/20 21:14   


 


 


 Guaifenesin/


 Dextromethorphan


  (Robitussin DM


 Syrup)  10 ml  Q4H  PRN


 ORAL


 For Cough  2/14/20 07:00


 3/15/20 06:59  2/14/20 21:06


 


 


 Ondansetron HCl


  (Zofran)  4 mg  Q6H  PRN


 IVP


 Nausea & Vomiting  2/13/20 21:15


 3/14/20 21:14   


 


 


 Pantoprazole


  (Protonix)  40 mg  DAILY


 ORAL


   2/14/20 09:00


 3/15/20 08:59  2/15/20 08:42


 


 


 Prednisone


  (predniSONE)  20 mg  DAILY


 ORAL


   2/14/20 13:30


 3/15/20 13:29  2/15/20 08:43


 

















Rodolfo Limon MD Feb 15, 2020 11:47

## 2020-02-15 NOTE — NUR
NURSE NOTES:



Patient is sitting at bedside. Denies SOB at this time. No s/s of acute distress noted. 
Encouraged pt to use call light when in need of assistance; pt verbalized understanding. 
Will continue to monitor.

## 2020-02-15 NOTE — NUR
NURSE NOTES:



Report received from Vandana MORRIS. Patient is observed in bed, awake, alert, oriented, and 
able to make needs known. Respiratory even and unlabored. Patient is saturating >95% on 3L 
NC. Denies pain and/or SOB at this time. Bed is in lowest position with side rails upx2 and 
brakes are engaged. Encouraged patient to use call light when in need of assistance, patient 
verbalized understanding. Will continue to monitor.

## 2020-02-16 VITALS — DIASTOLIC BLOOD PRESSURE: 64 MMHG | SYSTOLIC BLOOD PRESSURE: 125 MMHG

## 2020-02-16 VITALS — DIASTOLIC BLOOD PRESSURE: 71 MMHG | SYSTOLIC BLOOD PRESSURE: 127 MMHG

## 2020-02-16 VITALS — SYSTOLIC BLOOD PRESSURE: 135 MMHG | DIASTOLIC BLOOD PRESSURE: 71 MMHG

## 2020-02-16 VITALS — DIASTOLIC BLOOD PRESSURE: 68 MMHG | SYSTOLIC BLOOD PRESSURE: 152 MMHG

## 2020-02-16 VITALS — SYSTOLIC BLOOD PRESSURE: 126 MMHG | DIASTOLIC BLOOD PRESSURE: 62 MMHG

## 2020-02-16 VITALS — SYSTOLIC BLOOD PRESSURE: 134 MMHG | DIASTOLIC BLOOD PRESSURE: 73 MMHG

## 2020-02-16 RX ADMIN — AZITHROMYCIN DIHYDRATE SCH MG: 250 TABLET, FILM COATED ORAL at 08:30

## 2020-02-16 RX ADMIN — GUAIFENESIN AND DEXTROMETHORPHAN PRN ML: 100; 10 SYRUP ORAL at 14:50

## 2020-02-16 RX ADMIN — SODIUM CHLORIDE SCH MLS/HR: 0.9 INJECTION INTRAVENOUS at 11:56

## 2020-02-16 NOTE — GENERAL PROGRESS NOTE
Assessment/Plan


Problem List:  


(1) Pneumonia


ICD Codes:  J18.9 - Pneumonia, unspecified organism


SNOMED:  179410718


Qualifiers:  


   Qualified Codes:  J18.9 - Pneumonia, unspecified organism


(2) COPD with exacerbation


ICD Codes:  J44.1 - Chronic obstructive pulmonary disease with (acute) 

exacerbation


SNOMED:  282791884


Status:  progressing


Assessment/Plan:


afebrile


no wheezing


dc in am


copd


pna





Subjective


ROS Limited/Unobtainable:  Yes


Allergies:  


Coded Allergies:  


     No Known Allergies (Unverified , 2/13/20)





Objective





Last 24 Hour Vital Signs








  Date Time  Temp Pulse Resp B/P (MAP) Pulse Ox O2 Delivery O2 Flow Rate FiO2


 


2/16/20 21:00      Nasal Cannula 2.0 


 


2/16/20 20:00  88      


 


2/16/20 20:00 98.1 87 20 127/71 (89) 99   


 


2/16/20 16:21 97.7 86 20 125/64 (84)    


 


2/16/20 16:00  80      


 


2/16/20 12:00 97.7 97 22 126/62 (83)    


 


2/16/20 12:00  80      


 


2/16/20 09:43      Nasal Cannula 2.0 


 


2/16/20 08:53     98 Nasal Cannula 2.0 28


 


2/16/20 08:31  92  152/68    


 


2/16/20 08:20  92      


 


2/16/20 08:20 98.2 92 18 152/68 (96)    


 


2/16/20 07:00  70      


 


2/16/20 04:00 97.1 79 17 134/73 (93) 98   


 


2/16/20 00:00  68      


 


2/16/20 00:00 97.2 78 18 135/71 (92) 99   

















Intake and Output  


 


 2/15/20 2/16/20





 19:00 07:00


 


Intake Total 600 ml 600 ml


 


Balance 600 ml 600 ml


 


  


 


Intake Oral 600 ml 600 ml


 


# Voids 3 9








Height (Feet):  5


Height (Inches):  7.00


Weight (Pounds):  160


Cardiovascular:  normal rate


Respiratory/Chest:  lungs clear


Abdomen:  soft











Car Reyez MD Feb 16, 2020 22:06

## 2020-02-16 NOTE — NUR
NURSE NOTES: Report received from MOISÉS Babcock. Patient laying in bed in 3L NC. Denies SOB or 
pain. [place]. R AC 20g IV SL. Bed on lowest position, side rails upx2, brakes engaged. Call 
light within easy reach. Fall precautions implemented, fall prevention communicated.

## 2020-02-16 NOTE — HEMATOLOGY/ONC PROGRESS NOTE
Assessment/Plan


Assessment/Plan


Assessment and Recs:


# Anemia of chronic disease due to underlying chronic medical issues, 

multifactorial v Gi bleed 


--> Anemia workup has been ordered, rule out gi bleed 


--> No evidence of hemolysis is noted, peripheral smear has been reviewed.


--> Hgb goal >7. Transfuse prn.


--> Epogen or iron at this time is not particularly indicated


--> Medications have been reviewed


--> low threshold for gi evaluation in case has occult +


# Hemoptysis -- r/o malignancy


--> likely bronchitis


--> any further imaging as per Dr. Ashly herndon


# Pneumonia central


--> ctx and azithro


--> breathing treatment as needed


# COPD with exacerbation


--> off breathing rx


--> steroids 


# Gerd -- ppi


# Dvt ppx scds





Appreciate consultation and dw Rn





Subjective


Allergies:  


Coded Allergies:  


     No Known Allergies (Unverified , 2/13/20)


Subjective


2/16: tele, awake and alert, no acute distress, abx and steroids





Objective


Objective





Current Medications








 Medications


  (Trade)  Dose


 Ordered  Sig/Lynn


 Route


 PRN Reason  Start Time


 Stop Time Status Last Admin


Dose Admin


 


 Acetaminophen


  (Tylenol)  650 mg  Q4H  PRN


 ORAL


 Mild Pain/Temp > 100.5  2/13/20 21:15


 3/14/20 21:14   


 


 


 Albuterol/


 Ipratropium


  (Albuterol/


 Ipratropium)  3 ml  Q4H  PRN


 HHN


 Shortness of Breath  2/13/20 21:15


 2/18/20 21:14  2/13/20 22:12


 


 


 Amlodipine


 Besylate


  (Norvasc)  5 mg  DAILY


 ORAL


   2/14/20 09:00


 3/15/20 08:59  2/16/20 08:31


 


 


 Azithromycin


  (Zithromax)  250 mg  DAILY


 ORAL


   2/15/20 09:00


 2/22/20 08:59  2/16/20 08:30


 


 


 Ceftriaxone


 Sodium 1 gm/


 Dextrose  55 ml @ 


 110 mls/hr  Q24H


 IVPB


   2/14/20 11:00


 2/21/20 10:59  2/15/20 11:09


 


 


 Clonidine HCl


  (Catapres Tab)  0.1 mg  Q6H  PRN


 ORAL


 SBP>170  2/13/20 21:15


 3/14/20 21:14   


 


 


 Guaifenesin/


 Dextromethorphan


  (Robitussin DM


 Syrup)  10 ml  Q4H  PRN


 ORAL


 For Cough  2/14/20 07:00


 3/15/20 06:59  2/14/20 21:06


 


 


 Ondansetron HCl


  (Zofran)  4 mg  Q6H  PRN


 IVP


 Nausea & Vomiting  2/13/20 21:15


 3/14/20 21:14   


 


 


 Pantoprazole


  (Protonix)  40 mg  DAILY


 ORAL


   2/14/20 09:00


 3/15/20 08:59  2/16/20 08:30


 


 


 Prednisone


  (predniSONE)  20 mg  DAILY


 ORAL


   2/14/20 13:30


 3/15/20 13:29  2/16/20 08:30


 











Last 24 Hour Vital Signs








  Date Time  Temp Pulse Resp B/P (MAP) Pulse Ox O2 Delivery O2 Flow Rate FiO2


 


2/16/20 09:43      Room Air  


 


2/16/20 08:53     98 Nasal Cannula 2.0 28


 


2/16/20 08:31  92  152/68    


 


2/16/20 08:20  92      


 


2/16/20 08:20 98.2 92 18 152/68 (96)    


 


2/16/20 07:00  70      


 


2/16/20 04:00 97.1 79 17 134/73 (93) 98   


 


2/16/20 00:00  68      


 


2/16/20 00:00 97.2 78 18 135/71 (92) 99   


 


2/15/20 21:00      Nasal Cannula 3.0 


 


2/15/20 21:00  79      


 


2/15/20 20:20     97 Nasal Cannula 2.0 28


 


2/15/20 20:00 97.7 69 17 147/75 (99) 97   


 


2/15/20 16:22  80      


 


2/15/20 16:07 99.1 83 20 145/69 (94) 96   


 


2/15/20 12:00 98.2 87 20 150/65 (93) 95   


 


2/15/20 12:00  80      


 


2/15/20 09:00      Nasal Cannula 3.0 


 


2/15/20 08:43  92  147/70    


 


2/15/20 08:00  93      


 


2/15/20 08:00 97.9 92 20 147/70 (95) 95   


 


2/15/20 07:45     99 Nasal Cannula 2.0 28


 


2/15/20 04:00  62      


 


2/15/20 04:00 98.7 79 18 158/81 (106) 100   


 


2/15/20 00:00  70      


 


2/15/20 00:00 96.6 82 18 125/67 (86) 98   


 


2/14/20 23:31     97 Nasal Cannula 2.0 28


 


2/14/20 21:00      Nasal Cannula 3.0 


 


2/14/20 20:00  84      


 


2/14/20 20:00 97.3 89 20 142/69 (93) 100   


 


2/14/20 16:00 98.1 87 19 149/70 (96) 96   


 


2/14/20 15:45  76      


 


2/14/20 12:01 97.7 89 18 145/73 (97) 97   


 


2/14/20 12:00  94      

















Intake and Output  


 


 2/15/20 2/16/20





 19:00 07:00


 


Intake Total 600 ml 600 ml


 


Balance 600 ml 600 ml


 


  


 


Intake Oral 600 ml 600 ml


 


# Voids 3 9











Labs








Test


  2/13/20


14:36


 


White Blood Count


  10.7 K/UL


(4.8-10.8)


 


Red Blood Count


  4.12 M/UL


(4.70-6.10)


 


Hemoglobin


  11.2 G/DL


(14.2-18.0)


 


Hematocrit


  34.5 %


(42.0-52.0)


 


Mean Corpuscular Volume 84 FL (80-99) 


 


Mean Corpuscular Hemoglobin


  27.3 PG


(27.0-31.0)


 


Mean Corpuscular Hemoglobin


Concent 32.6 G/DL


(32.0-36.0)


 


Red Cell Distribution Width


  13.0 %


(11.6-14.8)


 


Platelet Count


  320 K/UL


(150-450)


 


Mean Platelet Volume


  7.3 FL


(6.5-10.1)


 


Neutrophils (%) (Auto)


  79.8 %


(45.0-75.0)


 


Lymphocytes (%) (Auto)


  9.4 %


(20.0-45.0)


 


Monocytes (%) (Auto)


  8.9 %


(1.0-10.0)


 


Eosinophils (%) (Auto)


  1.2 %


(0.0-3.0)


 


Basophils (%) (Auto)


  0.7 %


(0.0-2.0)


 


Sodium Level


  140 MMOL/L


(136-145)


 


Potassium Level


  4.2 MMOL/L


(3.5-5.1)


 


Chloride Level


  102 MMOL/L


()


 


Carbon Dioxide Level


  31 MMOL/L


(21-32)


 


Anion Gap


  8 mmol/L


(5-15)


 


Blood Urea Nitrogen


  26 mg/dL


(7-18)


 


Creatinine


  1.1 MG/DL


(0.55-1.30)


 


Estimat Glomerular Filtration


Rate > 60 mL/min


(>60)


 


Glucose Level


  148 MG/DL


()


 


Lactic Acid Level


  1.50 mmol/L


(0.4-2.0)


 


Calcium Level


  9.1 MG/DL


(8.5-10.1)


 


Phosphorus Level


  3.3 MG/DL


(2.5-4.9)


 


Magnesium Level


  1.9 MG/DL


(1.8-2.4)


 


Total Bilirubin


  0.3 MG/DL


(0.2-1.0)


 


Aspartate Amino Transf


(AST/SGOT) 28 U/L (15-37) 


 


 


Alanine Aminotransferase


(ALT/SGPT) 31 U/L (12-78) 


 


 


Alkaline Phosphatase


  83 U/L


()


 


Troponin I


  0.000 ng/mL


(0.000-0.056)


 


Pro-B-Type Natriuretic Peptide


  387 pg/mL


(0-125)


 


Total Protein


  7.7 G/DL


(6.4-8.2)


 


Albumin


  2.8 G/DL


(3.4-5.0)


 


Globulin 4.9 g/dL 


 


Albumin/Globulin Ratio 0.6 (1.0-2.7) 


 


Lipase


  232 U/L


()








Height (Feet):  5


Height (Inches):  7.00


Weight (Pounds):  160


Objective


Physical Exam:


Vitals: reviewed


General:  NAD


HEENT:  nc, at


Neck: supple


Chest: clear breath sounds bilaterally


Cardiovascular:  RRR, no s3, s4


Abdomen:  soft, nontender, nd


Extremities: no cce, normal range of motion


Neuro: confused











Amauri Leal MD Feb 16, 2020 11:38

## 2020-02-16 NOTE — INFECTIOUS DISEASES PROG NOTE
Assessment/Plan


Assessment/Plan


IMPRESSION:  


COPD exacerbation, 


hemoptysis l


hypertension, 


history of nicotine dependence.





RECOMMENDATION:  


Continue Zithromax and Rocephin


CT scan of chest





Subjective


ROS Limited/Unobtainable:  No


Respiratory:  Reports: shortness of breath, productive cough, other - blood in 

sputum


Gastrointestinal/Abdominal:  Reports: no symptoms


Genitourinary:  Reports: no symptoms


Allergies:  


Coded Allergies:  


     No Known Allergies (Unverified , 2/13/20)





Objective


Vital Signs





Last 24 Hour Vital Signs








  Date Time  Temp Pulse Resp B/P (MAP) Pulse Ox O2 Delivery O2 Flow Rate FiO2


 


2/16/20 09:43      Nasal Cannula 2.0 


 


2/16/20 08:53     98 Nasal Cannula 2.0 28


 


2/16/20 08:31  92  152/68    


 


2/16/20 08:20  92      


 


2/16/20 08:20 98.2 92 18 152/68 (96)    


 


2/16/20 07:00  70      


 


2/16/20 04:00 97.1 79 17 134/73 (93) 98   


 


2/16/20 00:00  68      


 


2/16/20 00:00 97.2 78 18 135/71 (92) 99   


 


2/15/20 21:00      Nasal Cannula 3.0 


 


2/15/20 21:00  79      


 


2/15/20 20:20     97 Nasal Cannula 2.0 28


 


2/15/20 20:00 97.7 69 17 147/75 (99) 97   


 


2/15/20 16:22  80      


 


2/15/20 16:07 99.1 83 20 145/69 (94) 96   








Height (Feet):  5


Height (Inches):  7.00


Weight (Pounds):  160


General Appearance:  no acute distress


HEENT:  mucous membranes moist


Respiratory/Chest:  respiratory distress - by nasal cannula, other


Cardiovascular:  normal rate


Abdomen:  soft, non tender


Extremities:  no edema


Neurologic/Psychiatric:  alert, oriented x 3, responsive





Microbiology








 Date/Time


Source Procedure


Growth Status


 


 


 2/13/20 14:45


Blood Blood Culture - Preliminary


NO GROWTH AFTER 48 HOURS Resulted


 


 2/13/20 14:30


Blood Blood Culture - Preliminary


NO GROWTH AFTER 48 HOURS Resulted


 


 2/13/20 19:45


Nasal Nares MRSA Culture - Final


NO METHICILLIN RESISTANT STAPH AUREUS... Complete


 


 2/13/20 19:45


Rectum - Final


NO CARBAPENEM-RESISTANT ENTEROBACTERI... Complete


 


 2/13/20 19:45


Rectum VRE Culture - Final


NO VANCOMYCIN RESISTANT ENTEROCOCCUS ... Complete











Current Medications








 Medications


  (Trade)  Dose


 Ordered  Sig/Lynn


 Route


 PRN Reason  Start Time


 Stop Time Status Last Admin


Dose Admin


 


 Acetaminophen


  (Tylenol)  650 mg  Q4H  PRN


 ORAL


 Mild Pain/Temp > 100.5  2/13/20 21:15


 3/14/20 21:14   


 


 


 Albuterol/


 Ipratropium


  (Albuterol/


 Ipratropium)  3 ml  Q4H  PRN


 HHN


 Shortness of Breath  2/13/20 21:15


 2/18/20 21:14  2/13/20 22:12


 


 


 Amlodipine


 Besylate


  (Norvasc)  5 mg  DAILY


 ORAL


   2/14/20 09:00


 3/15/20 08:59  2/16/20 08:31


 


 


 Azithromycin


  (Zithromax)  250 mg  DAILY


 ORAL


   2/15/20 09:00


 2/22/20 08:59  2/16/20 08:30


 


 


 Ceftriaxone


 Sodium 1 gm/


 Dextrose  55 ml @ 


 110 mls/hr  Q24H


 IVPB


   2/14/20 11:00


 2/21/20 10:59  2/16/20 11:56


 


 


 Clonidine HCl


  (Catapres Tab)  0.1 mg  Q6H  PRN


 ORAL


 SBP>170  2/13/20 21:15


 3/14/20 21:14   


 


 


 Guaifenesin/


 Dextromethorphan


  (Robitussin DM


 Syrup)  10 ml  Q4H  PRN


 ORAL


 For Cough  2/14/20 07:00


 3/15/20 06:59  2/14/20 21:06


 


 


 Ondansetron HCl


  (Zofran)  4 mg  Q6H  PRN


 IVP


 Nausea & Vomiting  2/13/20 21:15


 3/14/20 21:14   


 


 


 Pantoprazole


  (Protonix)  40 mg  DAILY


 ORAL


   2/14/20 09:00


 3/15/20 08:59  2/16/20 08:30


 


 


 Prednisone


  (predniSONE)  20 mg  DAILY


 ORAL


   2/14/20 13:30


 3/15/20 13:29  2/16/20 08:30


 

















Minh Carvalho MD Feb 16, 2020 12:19

## 2020-02-16 NOTE — NUR
NURSE NOTES: RECEIVED PATIENT LYING IN BED, AWAKE, ORIENTED X4, DENIES PAIN. NO SIGNS AND 
SYMPTOMS OF ACUTE CARDIO RESPIRATORY DISTRESS/SHORTNESS OF BREATH, DENIES CHEST PAIN, NO 
PERIPHERAL EDEMA NOTED. TOLERATING 02 3L VIA N/C, SP02 97%.  SINUS RHYTHM ON CARDIAC 
MONITOR. IV INTACT TO RIGHT AC/GAUGE 20 SL, PATENT. ABDOMEN SOFT/NON DISTENDED/NON TENDER, 
VOMIT X1 REPORTED/PATIENT REFUSED ZOFRAN. SIDE RAILS UP X2, BED IN LOWEST POSITION FOR 
SAFETY. ENCOURAGED PATIENT TO UTILIZE CALL LIGHT FOR ASSISTANCE, VERBALIZED UNDERSTANDING. 
CONTINUE WITH CURRENT PLAN OF CARE. DCP ONGOING. NAD.

## 2020-02-16 NOTE — PULMONOLOGY PROGRESS NOTE
Assessment/Plan


Assessment/Plan


IMPRESSION:  Acute exacerbation of COPD.





DISCUSSION:  





Agree with current admission and care.  


Continue home medications.  


Continue azithromycin and IV Rocephin.  


Continue prednisone orally.


Continue breathing treatments.  





Anticipate discharge, back to skilled nursing facility 


OK to dc today














  ______________________________________________


  Rodolfo Limon M.D.





Subjective


Interval Events:  Looking and feeling better


Constitutional:  Reports: no symptoms


HEENT:  Repors: no symptoms


Respiratory:  Reports: no symptoms


Cardiovascular:  Reports: no symptoms


Gastrointestinal/Abdominal:  Reports: no symptoms


Genitourinary:  Reports: no symptoms


Allergies:  


Coded Allergies:  


     No Known Allergies (Unverified , 2/13/20)





Objective





Last 24 Hour Vital Signs








  Date Time  Temp Pulse Resp B/P (MAP) Pulse Ox O2 Delivery O2 Flow Rate FiO2


 


2/16/20 09:43      Room Air  


 


2/16/20 08:53     98 Nasal Cannula 2.0 28


 


2/16/20 08:31  92  152/68    


 


2/16/20 08:20  92      


 


2/16/20 08:20 98.2 92 18 152/68 (96)    


 


2/16/20 07:00  70      


 


2/16/20 04:00 97.1 79 17 134/73 (93) 98   


 


2/16/20 00:00  68      


 


2/16/20 00:00 97.2 78 18 135/71 (92) 99   


 


2/15/20 21:00      Nasal Cannula 3.0 


 


2/15/20 21:00  79      


 


2/15/20 20:20     97 Nasal Cannula 2.0 28


 


2/15/20 20:00 97.7 69 17 147/75 (99) 97   


 


2/15/20 16:22  80      


 


2/15/20 16:07 99.1 83 20 145/69 (94) 96   


 


2/15/20 12:00 98.2 87 20 150/65 (93) 95   


 


2/15/20 12:00  80      

















Intake and Output  


 


 2/15/20 2/16/20





 19:00 07:00


 


Intake Total 600 ml 600 ml


 


Balance 600 ml 600 ml


 


  


 


Intake Oral 600 ml 600 ml


 


# Voids 3 9








General Appearance:  no acute distress


HEENT:  normocephalic


Respiratory/Chest:  chest wall non-tender, lungs clear


Cardiovascular:  normal peripheral pulses


Abdomen:  normal bowel sounds





Microbiology








 Date/Time


Source Procedure


Growth Status


 


 


 2/13/20 14:45


Blood Blood Culture - Preliminary


NO GROWTH AFTER 48 HOURS Resulted


 


 2/13/20 14:30


Blood Blood Culture - Preliminary


NO GROWTH AFTER 48 HOURS Resulted


 


 2/13/20 19:45


Nasal Nares MRSA Culture - Final


NO METHICILLIN RESISTANT STAPH AUREUS... Complete


 


 2/13/20 19:45


Rectum - Final


NO CARBAPENEM-RESISTANT ENTEROBACTERI... Complete


 


 2/13/20 19:45


Rectum VRE Culture - Final


NO VANCOMYCIN RESISTANT ENTEROCOCCUS ... Complete











Current Medications








 Medications


  (Trade)  Dose


 Ordered  Sig/Lynn


 Route


 PRN Reason  Start Time


 Stop Time Status Last Admin


Dose Admin


 


 Acetaminophen


  (Tylenol)  650 mg  Q4H  PRN


 ORAL


 Mild Pain/Temp > 100.5  2/13/20 21:15


 3/14/20 21:14   


 


 


 Albuterol/


 Ipratropium


  (Albuterol/


 Ipratropium)  3 ml  Q4H  PRN


 HHN


 Shortness of Breath  2/13/20 21:15


 2/18/20 21:14  2/13/20 22:12


 


 


 Amlodipine


 Besylate


  (Norvasc)  5 mg  DAILY


 ORAL


   2/14/20 09:00


 3/15/20 08:59  2/16/20 08:31


 


 


 Azithromycin


  (Zithromax)  250 mg  DAILY


 ORAL


   2/15/20 09:00


 2/22/20 08:59  2/16/20 08:30


 


 


 Ceftriaxone


 Sodium 1 gm/


 Dextrose  55 ml @ 


 110 mls/hr  Q24H


 IVPB


   2/14/20 11:00


 2/21/20 10:59  2/15/20 11:09


 


 


 Clonidine HCl


  (Catapres Tab)  0.1 mg  Q6H  PRN


 ORAL


 SBP>170  2/13/20 21:15


 3/14/20 21:14   


 


 


 Guaifenesin/


 Dextromethorphan


  (Robitussin DM


 Syrup)  10 ml  Q4H  PRN


 ORAL


 For Cough  2/14/20 07:00


 3/15/20 06:59  2/14/20 21:06


 


 


 Ondansetron HCl


  (Zofran)  4 mg  Q6H  PRN


 IVP


 Nausea & Vomiting  2/13/20 21:15


 3/14/20 21:14   


 


 


 Pantoprazole


  (Protonix)  40 mg  DAILY


 ORAL


   2/14/20 09:00


 3/15/20 08:59  2/16/20 08:30


 


 


 Prednisone


  (predniSONE)  20 mg  DAILY


 ORAL


   2/14/20 13:30


 3/15/20 13:29  2/16/20 08:30


 

















Rodolfo Limon MD Feb 16, 2020 11:46

## 2020-02-16 NOTE — NUR
NURSE NOTES: CONTINUE TO REFUSE [facility]OSPITAL GOWN, REMAIN IN STREET CLOTHES, STATED "I AM 
GOING TO [place] TODAY". SAFETY MAINTAINED THROUGHOUT THE NIGHT. CONTINUE WITH CURRENT PLAN 
OF CARE. NAD.

## 2020-02-16 NOTE — NUR
NURSE NOTES: Noticed a bucket of vomit at side of bed. Pt. explained it's his breakfast that 
came out. Offered Zofran per order, refused saying right now all is good only coffee is what 
he needs. Will follow up.

## 2020-02-17 VITALS — SYSTOLIC BLOOD PRESSURE: 142 MMHG | DIASTOLIC BLOOD PRESSURE: 67 MMHG

## 2020-02-17 VITALS — SYSTOLIC BLOOD PRESSURE: 148 MMHG | DIASTOLIC BLOOD PRESSURE: 63 MMHG

## 2020-02-17 VITALS — DIASTOLIC BLOOD PRESSURE: 72 MMHG | SYSTOLIC BLOOD PRESSURE: 155 MMHG

## 2020-02-17 VITALS — SYSTOLIC BLOOD PRESSURE: 139 MMHG | DIASTOLIC BLOOD PRESSURE: 75 MMHG

## 2020-02-17 LAB
ADD MANUAL DIFF: NO
BASOPHILS NFR BLD AUTO: 0.6 % (ref 0–2)
EOSINOPHIL NFR BLD AUTO: 0.2 % (ref 0–3)
ERYTHROCYTE [DISTWIDTH] IN BLOOD BY AUTOMATED COUNT: 12.6 % (ref 11.6–14.8)
HCT VFR BLD CALC: 35.3 % (ref 42–52)
HGB BLD-MCNC: 11.6 G/DL (ref 14.2–18)
LYMPHOCYTES NFR BLD AUTO: 21.7 % (ref 20–45)
MCV RBC AUTO: 83 FL (ref 80–99)
MONOCYTES NFR BLD AUTO: 11.4 % (ref 1–10)
NEUTROPHILS NFR BLD AUTO: 66.1 % (ref 45–75)
PLATELET # BLD: 473 K/UL (ref 150–450)
RBC # BLD AUTO: 4.25 M/UL (ref 4.7–6.1)
WBC # BLD AUTO: 10.1 K/UL (ref 4.8–10.8)

## 2020-02-17 RX ADMIN — AZITHROMYCIN DIHYDRATE SCH MG: 250 TABLET, FILM COATED ORAL at 08:53

## 2020-02-17 RX ADMIN — SODIUM CHLORIDE SCH MLS/HR: 0.9 INJECTION INTRAVENOUS at 10:17

## 2020-02-17 NOTE — NUR
*-* DISCHARGE PLANNING *-*



PATIENT HAS BEEN REFERRED BACK TO:



[facility]

P; [phone]

F: [phone]





S/W [facility] PATIENT HAS BEEN ACCEPTED BACK

ROOM# [id number]

SKILLED

## 2020-02-17 NOTE — INFECTIOUS DISEASES PROG NOTE
Assessment/Plan


Assessment/Plan


IMPRESSION:  


COPD exacerbation, 


hemoptysis resolving


hypertension, 


history of nicotine dependence.





RECOMMENDATION:  


Can be discharged with PO Levaquin 500mg daily X 3 days


CT scan of chest if no improvent at latter time





Subjective


ROS Limited/Unobtainable:  No


Constitutional:  Denies: fever


Respiratory:  Reports: productive cough


Cardiovascular:  Reports: no symptoms


Gastrointestinal/Abdominal:  Reports: no symptoms


Genitourinary:  Reports: no symptoms


Allergies:  


Coded Allergies:  


     No Known Allergies (Unverified , 2/13/20)





Objective


Vital Signs





Last 24 Hour Vital Signs








  Date Time  Temp Pulse Resp B/P (MAP) Pulse Ox O2 Delivery O2 Flow Rate FiO2


 


2/17/20 08:52  94  155/72    


 


2/17/20 08:34  111      


 


2/17/20 08:24      Nasal Cannula 2.0 


 


2/17/20 08:00 97.7 94 20 155/72 (99) 96   


 


2/17/20 04:00 98.9 85 18 139/75 (96) 100   


 


2/17/20 04:00  88      


 


2/17/20 00:00 97.9 84 18 142/67 (92) 99   


 


2/17/20 00:00  74      


 


2/16/20 21:00      Nasal Cannula 2.0 


 


2/16/20 20:00  88      


 


2/16/20 20:00 98.1 87 20 127/71 (89) 99   


 


2/16/20 16:21 97.7 86 20 125/64 (84)    


 


2/16/20 16:00  80      


 


2/16/20 12:00 97.7 97 22 126/62 (83)    


 


2/16/20 12:00  80      








Height (Feet):  5


Height (Inches):  7.00


Weight (Pounds):  160


General Appearance:  no acute distress


HEENT:  mucous membranes moist


Respiratory/Chest:  other - oxygen by nasal cannula


Cardiovascular:  normal rate


Abdomen:  soft, non tender


Extremities:  no edema


Neurologic/Psychiatric:  alert, responsive





Laboratory Tests








Test


  2/17/20


06:15


 


White Blood Count


  10.1 K/UL


(4.8-10.8)


 


Red Blood Count


  4.25 M/UL


(4.70-6.10)  L


 


Hemoglobin


  11.6 G/DL


(14.2-18.0)  L


 


Hematocrit


  35.3 %


(42.0-52.0)  L


 


Mean Corpuscular Volume 83 FL (80-99)  


 


Mean Corpuscular Hemoglobin


  27.3 PG


(27.0-31.0)


 


Mean Corpuscular Hemoglobin


Concent 32.8 G/DL


(32.0-36.0)


 


Red Cell Distribution Width


  12.6 %


(11.6-14.8)


 


Platelet Count


  473 K/UL


(150-450)  H


 


Mean Platelet Volume


  6.6 FL


(6.5-10.1)


 


Neutrophils (%) (Auto)


  66.1 %


(45.0-75.0)


 


Lymphocytes (%) (Auto)


  21.7 %


(20.0-45.0)


 


Monocytes (%) (Auto)


  11.4 %


(1.0-10.0)  H


 


Eosinophils (%) (Auto)


  0.2 %


(0.0-3.0)


 


Basophils (%) (Auto)


  0.6 %


(0.0-2.0)











Current Medications








 Medications


  (Trade)  Dose


 Ordered  Sig/Lynn


 Route


 PRN Reason  Start Time


 Stop Time Status Last Admin


Dose Admin


 


 Acetaminophen


  (Tylenol)  650 mg  Q4H  PRN


 ORAL


 Mild Pain/Temp > 100.5  2/13/20 21:15


 3/14/20 21:14   


 


 


 Albuterol/


 Ipratropium


  (Albuterol/


 Ipratropium)  3 ml  Q4H  PRN


 HHN


 Shortness of Breath  2/13/20 21:15


 2/18/20 21:14  2/13/20 22:12


 


 


 Amlodipine


 Besylate


  (Norvasc)  5 mg  DAILY


 ORAL


   2/14/20 09:00


 3/15/20 08:59  2/17/20 08:52


 


 


 Azithromycin


  (Zithromax)  250 mg  DAILY


 ORAL


   2/15/20 09:00


 2/22/20 08:59  2/17/20 08:53


 


 


 Ceftriaxone


 Sodium 1 gm/


 Dextrose  55 ml @ 


 110 mls/hr  Q24H


 IVPB


   2/14/20 11:00


 2/21/20 10:59  2/17/20 10:17


 


 


 Clonidine HCl


  (Catapres Tab)  0.1 mg  Q6H  PRN


 ORAL


 SBP>170  2/13/20 21:15


 3/14/20 21:14   


 


 


 Guaifenesin/


 Dextromethorphan


  (Robitussin DM


 Syrup)  10 ml  Q4H  PRN


 ORAL


 For Cough  2/14/20 07:00


 3/15/20 06:59  2/16/20 14:50


 


 


 Ondansetron HCl


  (Zofran)  4 mg  Q6H  PRN


 IVP


 Nausea & Vomiting  2/13/20 21:15


 3/14/20 21:14   


 


 


 Pantoprazole


  (Protonix)  40 mg  DAILY


 ORAL


   2/14/20 09:00


 3/15/20 08:59  2/17/20 08:53


 


 


 Prednisone


  (predniSONE)  20 mg  DAILY


 ORAL


   2/14/20 13:30


 3/15/20 13:29  2/17/20 08:53


 

















Minh Carvalho MD Feb 17, 2020 10:24

## 2020-02-17 NOTE — NUR
NURSE NOTES:

Received report from ARTURO Ross. Pt sitting at edge of bed, talkative, pleasant, no c/o of 
breathing issues, on NC 3L no apparent SOB, discussed plan of care including discharge today 
and chest CT, bed in lowest position, call light within reach

## 2020-02-17 NOTE — PULMONOLOGY PROGRESS NOTE
Assessment/Plan


Assessment/Plan


IMPRESSION:  Acute exacerbation of COPD.





DISCUSSION:  





Agree with current admission and care.  


Continue home medications.  


Continue azithromycin and IV Rocephin.  


Continue prednisone orally.


Continue breathing treatments.  





Anticipate discharge, back to skilled nursing facility 


OK to dc 














  ______________________________________________


  Rodolfo Limon M.D.





Subjective


Interval Events:  None new


Constitutional:  Reports: no symptoms


HEENT:  Repors: no symptoms


Respiratory:  Reports: no symptoms


Cardiovascular:  Reports: no symptoms


Gastrointestinal/Abdominal:  Reports: no symptoms


Allergies:  


Coded Allergies:  


     No Known Allergies (Unverified , 2/13/20)





Objective





Last 24 Hour Vital Signs








  Date Time  Temp Pulse Resp B/P (MAP) Pulse Ox O2 Delivery O2 Flow Rate FiO2


 


2/17/20 08:52  94  155/72    


 


2/17/20 08:34  111      


 


2/17/20 08:24      Nasal Cannula 2.0 


 


2/17/20 08:00 97.7 94 20 155/72 (99) 96   


 


2/17/20 04:00 98.9 85 18 139/75 (96) 100   


 


2/17/20 04:00  88      


 


2/17/20 00:00 97.9 84 18 142/67 (92) 99   


 


2/17/20 00:00  74      


 


2/16/20 21:00      Nasal Cannula 2.0 


 


2/16/20 20:00  88      


 


2/16/20 20:00 98.1 87 20 127/71 (89) 99   


 


2/16/20 16:21 97.7 86 20 125/64 (84)    


 


2/16/20 16:00  80      


 


2/16/20 12:00 97.7 97 22 126/62 (83)    


 


2/16/20 12:00  80      

















Intake and Output  


 


 2/16/20 2/17/20





 19:00 07:00


 


Intake Total 540 ml 720 ml


 


Balance 540 ml 720 ml


 


  


 


Intake Oral 540 ml 720 ml


 


# Voids 3 3


 


# Bowel Movements  1








General Appearance:  no acute distress


HEENT:  normocephalic


Respiratory/Chest:  chest wall non-tender, lungs clear


Cardiovascular:  normal peripheral pulses, normal rate


Abdomen:  normal bowel sounds


Laboratory Tests


2/17/20 06:15: 


White Blood Count 10.1, Red Blood Count 4.25L, Hemoglobin 11.6L, Hematocrit 

35.3L, Mean Corpuscular Volume 83, Mean Corpuscular Hemoglobin 27.3, Mean 

Corpuscular Hemoglobin Concent 32.8, Red Cell Distribution Width 12.6, Platelet 

Count 473H, Mean Platelet Volume 6.6, Neutrophils (%) (Auto) 66.1, Lymphocytes (

%) (Auto) 21.7, Monocytes (%) (Auto) 11.4H, Eosinophils (%) (Auto) 0.2, 

Basophils (%) (Auto) 0.6





Current Medications








 Medications


  (Trade)  Dose


 Ordered  Sig/Lynn


 Route


 PRN Reason  Start Time


 Stop Time Status Last Admin


Dose Admin


 


 Acetaminophen


  (Tylenol)  650 mg  Q4H  PRN


 ORAL


 Mild Pain/Temp > 100.5  2/13/20 21:15


 3/14/20 21:14   


 


 


 Albuterol/


 Ipratropium


  (Albuterol/


 Ipratropium)  3 ml  Q4H  PRN


 HHN


 Shortness of Breath  2/13/20 21:15


 2/18/20 21:14  2/13/20 22:12


 


 


 Amlodipine


 Besylate


  (Norvasc)  5 mg  DAILY


 ORAL


   2/14/20 09:00


 3/15/20 08:59  2/17/20 08:52


 


 


 Azithromycin


  (Zithromax)  250 mg  DAILY


 ORAL


   2/15/20 09:00


 2/22/20 08:59  2/17/20 08:53


 


 


 Ceftriaxone


 Sodium 1 gm/


 Dextrose  55 ml @ 


 110 mls/hr  Q24H


 IVPB


   2/14/20 11:00


 2/21/20 10:59  2/17/20 10:17


 


 


 Clonidine HCl


  (Catapres Tab)  0.1 mg  Q6H  PRN


 ORAL


 SBP>170  2/13/20 21:15


 3/14/20 21:14   


 


 


 Guaifenesin/


 Dextromethorphan


  (Robitussin DM


 Syrup)  10 ml  Q4H  PRN


 ORAL


 For Cough  2/14/20 07:00


 3/15/20 06:59  2/16/20 14:50


 


 


 Ondansetron HCl


  (Zofran)  4 mg  Q6H  PRN


 IVP


 Nausea & Vomiting  2/13/20 21:15


 3/14/20 21:14   


 


 


 Pantoprazole


  (Protonix)  40 mg  DAILY


 ORAL


   2/14/20 09:00


 3/15/20 08:59  2/17/20 08:53


 


 


 Prednisone


  (predniSONE)  20 mg  DAILY


 ORAL


   2/14/20 13:30


 3/15/20 13:29  2/17/20 08:53


 

















Rodolfo Limon MD Feb 17, 2020 10:38

## 2020-02-17 NOTE — HEMATOLOGY/ONC PROGRESS NOTE
Assessment/Plan


Assessment/Plan


Assessment and Recs:


# Anemia of chronic disease due to underlying chronic medical issues, 

multifactorial v Gi bleed 


--> Anemia workup has been ordered, rule out gi bleed 


--> No evidence of hemolysis is noted, peripheral smear has been reviewed.


--> Hgb goal >7. Transfuse prn.


--> Epogen or iron at this time is not particularly indicated


--> Medications have been reviewed


--> low threshold for gi evaluation in case has occult +


# Hemoptysis -- r/o malignancy


--> likely bronchitis


--> any further imaging as per Dr. Ashly herndon


# Pneumonia central


--> ctx and azithro


--> breathing treatment as needed


# COPD with exacerbation


--> off breathing rx


--> steroids 


# Gerd -- ppi


# Dvt ppx scds





Appreciate consultation and dw Rn





Subjective


Constitutional:  Denies: no symptoms, chills, fever, malaise, weakness, other


HEENT:  Denies: no symptoms, eye pain, blurred vision, tearing, double vision, 

ear pain, ear discharge, nose pain, nose congestion, throat pain, throat 

swelling, mouth pain, mouth swelling, other


Cardiovascular:  Denies: no symptoms, chest pain, edema, irregular heart rate, 

lightheadedness, palpitations, syncope, other


Respiratory:  Denies: no symptoms, cough, shortness of breath, SOB with 

excertion, SOB at rest, sputum, wheezing, other


Gastrointestinal/Abdominal:  Denies: no symptoms, abdomen distended, abdominal 

pain, black stools, tarry stools, blood in stool, constipated, diarrhea, 

difficulty swallowing, nausea, poor appetite, poor fluid intake, rectal bleeding

, vomiting, other


Genitourinary:  Denies: no symptoms, burning, discharge, frequency, flank pain, 

hematuria, incontinence, pain, urgency, other


Neurologic/Psychiatric:  Denies: no symptoms, anxiety, depressed, emotional 

problems, headache, numbness, paresthesia, pre-existing deficit, seizure, 

tingling, tremors, weakness, other


Endocrine:  Denies: no symptoms, excessive sweating, flushing, intolerance to 

cold, intolerance to heat, increased hunger, increased thirst, increased urine, 

unexplained weight gain, unexplained weight loss, other


Allergies:  


Coded Allergies:  


     No Known Allergies (Unverified , 2/13/20)


Subjective


2/16: tele, awake and alert, no acute distress, abx and steroids 


2/17: no major changes, on abx, as per pcp, pulm, labs noted





Objective


Objective





Current Medications








 Medications


  (Trade)  Dose


 Ordered  Sig/Lynn


 Route


 PRN Reason  Start Time


 Stop Time Status Last Admin


Dose Admin


 


 Acetaminophen


  (Tylenol)  650 mg  Q4H  PRN


 ORAL


 Mild Pain/Temp > 100.5  2/13/20 21:15


 3/14/20 21:14   


 


 


 Albuterol/


 Ipratropium


  (Albuterol/


 Ipratropium)  3 ml  Q4H  PRN


 HHN


 Shortness of Breath  2/13/20 21:15


 2/18/20 21:14  2/13/20 22:12


 


 


 Amlodipine


 Besylate


  (Norvasc)  5 mg  DAILY


 ORAL


   2/14/20 09:00


 3/15/20 08:59  2/17/20 08:52


 


 


 Azithromycin


  (Zithromax)  250 mg  DAILY


 ORAL


   2/15/20 09:00


 2/22/20 08:59  2/17/20 08:53


 


 


 Ceftriaxone


 Sodium 1 gm/


 Dextrose  55 ml @ 


 110 mls/hr  Q24H


 IVPB


   2/14/20 11:00


 2/21/20 10:59  2/16/20 11:56


 


 


 Clonidine HCl


  (Catapres Tab)  0.1 mg  Q6H  PRN


 ORAL


 SBP>170  2/13/20 21:15


 3/14/20 21:14   


 


 


 Guaifenesin/


 Dextromethorphan


  (Robitussin DM


 Syrup)  10 ml  Q4H  PRN


 ORAL


 For Cough  2/14/20 07:00


 3/15/20 06:59  2/16/20 14:50


 


 


 Ondansetron HCl


  (Zofran)  4 mg  Q6H  PRN


 IVP


 Nausea & Vomiting  2/13/20 21:15


 3/14/20 21:14   


 


 


 Pantoprazole


  (Protonix)  40 mg  DAILY


 ORAL


   2/14/20 09:00


 3/15/20 08:59  2/17/20 08:53


 


 


 Prednisone


  (predniSONE)  20 mg  DAILY


 ORAL


   2/14/20 13:30


 3/15/20 13:29  2/17/20 08:53


 











Last 24 Hour Vital Signs








  Date Time  Temp Pulse Resp B/P (MAP) Pulse Ox O2 Delivery O2 Flow Rate FiO2


 


2/17/20 08:52  94  155/72    


 


2/17/20 08:34  111      


 


2/17/20 08:24      Nasal Cannula 2.0 


 


2/17/20 08:00 97.7 94 20 155/72 (99) 96   


 


2/17/20 04:00 98.9 85 18 139/75 (96) 100   


 


2/17/20 04:00  88      


 


2/17/20 00:00 97.9 84 18 142/67 (92) 99   


 


2/17/20 00:00  74      


 


2/16/20 21:00      Nasal Cannula 2.0 


 


2/16/20 20:00  88      


 


2/16/20 20:00 98.1 87 20 127/71 (89) 99   


 


2/16/20 16:21 97.7 86 20 125/64 (84)    


 


2/16/20 16:00  80      


 


2/16/20 12:00 97.7 97 22 126/62 (83)    


 


2/16/20 12:00  80      


 


2/16/20 09:43      Nasal Cannula 2.0 


 


2/16/20 08:53     98 Nasal Cannula 2.0 28


 


2/16/20 08:31  92  152/68    


 


2/16/20 08:20  92      


 


2/16/20 08:20 98.2 92 18 152/68 (96)    


 


2/16/20 07:00  70      


 


2/16/20 04:00 97.1 79 17 134/73 (93) 98   


 


2/16/20 00:00  68      


 


2/16/20 00:00 97.2 78 18 135/71 (92) 99   


 


2/15/20 21:00      Nasal Cannula 3.0 


 


2/15/20 21:00  79      


 


2/15/20 20:20     97 Nasal Cannula 2.0 28


 


2/15/20 20:00 97.7 69 17 147/75 (99) 97   


 


2/15/20 16:22  80      


 


2/15/20 16:07 99.1 83 20 145/69 (94) 96   


 


2/15/20 12:00 98.2 87 20 150/65 (93) 95   


 


2/15/20 12:00  80      

















Intake and Output  


 


 2/16/20 2/17/20





 19:00 07:00


 


Intake Total 540 ml 720 ml


 


Balance 540 ml 720 ml


 


  


 


Intake Oral 540 ml 720 ml


 


# Voids 3 3


 


# Bowel Movements  1











Labs








Test


  2/17/20


06:15


 


White Blood Count


  10.1 K/UL


(4.8-10.8)


 


Red Blood Count


  4.25 M/UL


(4.70-6.10)


 


Hemoglobin


  11.6 G/DL


(14.2-18.0)


 


Hematocrit


  35.3 %


(42.0-52.0)


 


Mean Corpuscular Volume 83 FL (80-99) 


 


Mean Corpuscular Hemoglobin


  27.3 PG


(27.0-31.0)


 


Mean Corpuscular Hemoglobin


Concent 32.8 G/DL


(32.0-36.0)


 


Red Cell Distribution Width


  12.6 %


(11.6-14.8)


 


Platelet Count


  473 K/UL


(150-450)


 


Mean Platelet Volume


  6.6 FL


(6.5-10.1)


 


Neutrophils (%) (Auto)


  66.1 %


(45.0-75.0)


 


Lymphocytes (%) (Auto)


  21.7 %


(20.0-45.0)


 


Monocytes (%) (Auto)


  11.4 %


(1.0-10.0)


 


Eosinophils (%) (Auto)


  0.2 %


(0.0-3.0)


 


Basophils (%) (Auto)


  0.6 %


(0.0-2.0)








Height (Feet):  5


Height (Inches):  7.00


Weight (Pounds):  160


Objective


Physical Exam:


Vitals: reviewed


General:  NAD


HEENT:  nc, at


Neck: supple


Chest: clear breath sounds bilaterally


Cardiovascular:  RRR, no s3, s4


Abdomen:  soft, nontender, nd


Extremities: no cce, normal range of motion


Neuro: confused











Amauri Leal MD Feb 17, 2020 10:23

## 2020-02-17 NOTE — NUR
NURSE NOTES:

Pt discharged back to [facility] with all belongings. IV removed intact, ID band removed, tele box 
returned, pt signed all DC paperwork. PT stable for discharge

## 2020-02-17 NOTE — DIAGNOSTIC IMAGING REPORT
Clinical Indication: Chest pain, cough

 

Technique: Spiral acquisitions obtained through the chest. No IV contrast utilized,

per referring physician request. Multiplanar reconstructions generated. Total dose

length product 164 mGycm. CTDIvol(s) 3 mGy.  Dose reduction achieved using automated

exposure control

 

 

Comparison: No comparison CT scans. Reference made to chest radiograph 2/13/2020

 

Findings: The lungs are diffusely hyperinflated. There are a few small bullae present

in the right middle lobe and right lower lobe and a single tiny bubble of in the left

lower lobe.. There is very mild interstitial septal thickening and mild bronchial

wall thickening. A few areas of scarring are seen in the left upper lobe and left

lower lobe. No definite infiltrates, effusions, masses, or nodules.

 

The heart size is normal. There is some very focal pericardial thickening anteriorly.

There are dense coronary calcifications. There may be a stent in the left anterior

descending artery. No mediastinal or hilar mass or adenopathy. There is a small to

moderate-sized sliding-type hiatal hernia. The remainder of the esophagus is

unremarkable. The included thyroid is unremarkable. No axillary or chest wall mass or

adenopathy demonstrated. The bones demonstrate degenerative spondylosis changes.

There is a mild superior endplate compression fracture deformity of the T12 vertebral

body, and a slight vertebra plana compression fracture deformity of the T8 vertebral

body. There are degenerative spondylosis changes.

 

Included upper abdominal anatomy demonstrates colonic diverticulosis. There is

evidence of mild colonic fecal retention. There is a small gallstone noted.

 

Impression: COPD changes

 

No acute pulmonary process

 

Small to moderate sliding-type hiatal hernia

 

T8 and T12 vertebral body compression fractures, age-indeterminate. Consider MRI for

better characterization of these are considered clinically relevant

 

Coronary artery calcification and possible coronary stent

 

Colonic diverticulosis

 

Cholelithiasis

 

Possible constipation

 

The CT scanner at [facility] is accredited by the [employer] College of

[employer]iology and the scans are performed using protocols designed to limit radiation

exposure to as low as reasonably achievable to attain images of sufficient resolution

adequate for diagnostic evaluation.

## 2020-02-18 NOTE — DISCHARGE SUMMARY
Discharge Summary


Discharge Summary


_


DATE OF ADMISSION: 2/13/2020


DATE OF DISCHARGE: 2/17/2020








DISCHARGED BY: Dr. Reyez





REASON FOR ADMISSION: 


83 years old male with past medical history of hypertension, COPD, presented to 

emergency department with cough, shortness of breath.  


Patient recently was diagnosed with possible pneumonia.  


He denied fever.  


He denied chest pain.  


Upon evaluation no leukocytosis ,hemoglobin 11.2, hematocrit 34.5 ,platelet 

count 320.  


Stable electrolytes.  


BUN 26, creatinine 1.1.  


Lactic acid 1.5.  


Glucose 148.  


Troponin negative,  pro .  


Stable LFT and lipase.  


EKG revealed sinus rhythm,  no acute ischemic changes.


Chest x-ray revealed central bronchial wall thickening , nonspecific , no acute 

process otherwise .


Patient received empiric antibiotics,  nebulizing treatment with bronchodilator 

, oral steroid and admitted for further management.


 


CONSULTANTS:


cardiologist Dr. Su


pulmonary Dr. Limon


ID specialist Dr. Carvalho


hematologist/oncologist Dr. Leal


 


 


[facility] COURSE: 


Patient  admitted  to medical surgical floor.  


Pulmonologist followed.  


Blood cultures were negative.  


Supplemental oxygen provided and titrated to keep oximetry above 92%.  


Nebulizing treatment with bronchodilator provided as needed.  


Patient was on oral steroids. 


Antibiotics continued.  


DVT and GI prophylaxis provided.  


Antitussive provided as needed.  


CT of the chest demonstrated COPD changes, but  no acute pulmonary process 

otherwise. Small to moderate sliding-type hiatal hernia.  


Home medication continued.  


Blood pressure was managed with calcium channel blocker and remained stable.  

Clonidine was on board as needed for blood pressure spikes.  


Hemoglobin and hematocrit were closely monitored with goal to keep hemoglobin 

above 7.  Prior to discharge hemoglobin 11.6 ,hematocrit 35.3.





Patient clinically stabilized and was ready for  discharge  back to skilled 

nursing facility for continuation of care.  








FINAL DIAGNOSES: 


Acute exacerbation of COPD 


Hypertension 


History of nicotine dependency 


Anemia of chronic disease 


GERD





DISCHARGE MEDICATIONS:


See Medication Reconciliation list.





DISCHARGE INSTRUCTIONS:


Patient was discharged to the skilled nursing facility.


Follow up with medical doctor at the facility.








I have been assigned to dictate discharge summary for this account. 


I was not involved in the patient's management.











Adore Sun NP Feb 18, 2020 12:18

## 2020-03-17 NOTE — CDS PHYSICIAN QUERY
Clarification is required for compliance, coding accuracy, and to reflect 
severity of illness for this patient



Dear Dr. Reyez,                  Date: 03.17.20   CDS: Yousif Haile



A diagnosis of "Pneumonia" is documented throughout the chart and the patient 
was treated with Azithromycin. Please specify if pneumonia was ruled in or out.
                                                             



Please specify if pneumonia was:

 

[ ] Ruled in



[ ] Ruled out                                                                  
                       





Present on Admission:  [ ]  Yes          [ ]  No         [ ]  Clinically 
Undetermined





_________________                                    _____________

Physician signature                                       Date



.

MTDD